# Patient Record
(demographics unavailable — no encounter records)

---

## 2025-05-19 NOTE — PHYSICAL EXAM
[Alert] : alert [Well Nourished] : well nourished [Healthy Appearance] : healthy appearance [No Acute Distress] : no acute distress [Well Developed] : well developed [Normal Pupil & Iris Size/Symmetry] : normal pupil and iris size and symmetry [No Discharge] : no discharge [No Photophobia] : no photophobia [Sclera Not Icteric] : sclera not icteric [Normal TMs] : both tympanic membranes were normal [Normal Nasal Mucosa] : the nasal mucosa was normal [Normal Lips/Tongue] : the lips and tongue were normal [Normal Outer Ear/Nose] : the ears and nose were normal in appearance [Normal Tonsils] : normal tonsils [No Thrush] : no thrush [Pale mucosa] : pale mucosa [Pharyngeal erythema] : pharyngeal erythema [Posterior Pharyngeal Cobblestoning] : posterior pharyngeal cobblestoning [Clear Rhinorrhea] : clear rhinorrhea was seen [Supple] : the neck was supple [Normal Rate and Effort] : normal respiratory rhythm and effort [No Crackles] : no crackles [No Retractions] : no retractions [Bilateral Audible Breath Sounds] : bilateral audible breath sounds [Normal Rate] : heart rate was normal  [Normal S1, S2] : normal S1 and S2 [No murmur] : no murmur [Regular Rhythm] : with a regular rhythm [Soft] : abdomen soft [Not Tender] : non-tender [Not Distended] : not distended [No HSM] : no hepato-splenomegaly [Normal Cervical Lymph Nodes] : cervical [Skin Intact] : skin intact  [No Rash] : no rash [No Skin Lesions] : no skin lesions [No clubbing] : no clubbing [No Edema] : no edema [No Cyanosis] : no cyanosis [Normal Mood] : mood was normal [Normal Affect] : affect was normal [Alert, Awake, Oriented as Age-Appropriate] : alert, awake, oriented as age appropriate

## 2025-05-24 NOTE — REASON FOR VISIT
[Routine Follow-Up] : a routine follow-up visit for [Mother] : mother [FreeTextEntry2] : allergic rhinitis, food allergy to tree nuts

## 2025-05-24 NOTE — REASON FOR VISIT
. room air room air [Routine Follow-Up] : a routine follow-up visit for [Mother] : mother [FreeTextEntry2] : allergic rhinitis, food allergy to tree nuts

## 2025-05-24 NOTE — HISTORY OF PRESENT ILLNESS
[de-identified] : This is a 8 year old male with allergic rhinoconjunctivitis and tree nut allergy (except for almond) presents with mother for a follow up. Interval Hx: Foods that we're avoiding: All tree nuts except almonds. Eats peanut regularly (peanut butter and Reeses cups). Trying to get [place] in his diet (almond milk erica pudding, marzipan, uses almond milk in pancake). Won't eat whole almonds.   Last summer, on vacation in [facility], there was cross contamination-had chocolate ice cream that may have been contaminated with walnut he started to feel throat closure, gave Benadryl bc he was not having trouble breathing and he started to improve. Within twenty mins, started to vomit and then he was fine. Hives on body. Did not get Epi Pen. Benadryl every 6 hours when the hives went away.   Seasonal allergies: Zyrtec and Flonase nasal spray in the fall--gets through fall well. In Spring, worse, typically, end of April and May when pollen is bad--gets red itchy eyes. Uses Pataday, Flonase, Zyrtec daily.   End of April had significant eye symptoms, went to Urgent Care and given Prednisone for 5 days.   Has not used the eye drops in a week and a half.   Interacts with Dog a few times a month.   No asthma. No eczema. No medication allergies. No pets at home.   No history or symptoms of asthma, eczematous rashes, drug allergies.   Last Visit 06/2023 Patient continues avoidance of tree nuts (except almond). No accidental ingestions, carries EpiPen.  Previous Reaction History: - The patient initially developed generalized erythema and right eye edema with blotchy redness of the left eye after possibly being exposed to hazelnut containing chocolate. The patient was treated with Benadryl in the urgent care setting and symptoms resolved. Another dose was required in the morning. - During the holidays in 2018, the child ate a chocolate cookie with crushed pecans, and then an hour or hour and half after wards, the child developed generalized erythema. The symptoms were treated with Benadryl with good resolution. The patient then started to avoid tree nuts.  Has environmental allergies during Spring and Fall with Spring being worse. Symptoms are runny nose, sneezing, nasal congestion, itchy/watery eyes. Currently taking Zyrtec, Flonase nasal spray and olopatadine eye drops. Also on Singulair at bedtime. This regimen works well for him. Recent exposure to dog lead to periocular swelling which was treated with benadryl.  No asthma. No eczema. No medication allergies. No pets at home.   No history or symptoms of asthma, eczematous rashes, drug allergies.

## 2025-05-24 NOTE — HISTORY OF PRESENT ILLNESS
[de-identified] : This is a 8 year old male with allergic rhinoconjunctivitis and tree nut allergy (except for almond) presents with mother for a follow up. Interval Hx: Foods that we're avoiding: All tree nuts except almonds. Eats peanut regularly (peanut butter and Reeses cups). Trying to get [place] in his diet (almond milk erica pudding, marzipan, uses almond milk in pancake). Won't eat whole almonds.   Last summer, on vacation in [facility], there was cross contamination-had chocolate ice cream that may have been contaminated with walnut he started to feel throat closure, gave Benadryl bc he was not having trouble breathing and he started to improve. Within twenty mins, started to vomit and then he was fine. Hives on body. Did not get Epi Pen. Benadryl every 6 hours when the hives went away.   Seasonal allergies: Zyrtec and Flonase nasal spray in the fall--gets through fall well. In Spring, worse, typically, end of April and May when pollen is bad--gets red itchy eyes. Uses Pataday, Flonase, Zyrtec daily.   End of April had significant eye symptoms, went to Urgent Care and given Prednisone for 5 days.   Has not used the eye drops in a week and a half.   Interacts with Dog a few times a month.   No asthma. No eczema. No medication allergies. No pets at home.   No history or symptoms of asthma, eczematous rashes, drug allergies.   Last Visit 06/2023 Patient continues avoidance of tree nuts (except almond). No accidental ingestions, carries EpiPen.  Previous Reaction History: - The patient initially developed generalized erythema and right eye edema with blotchy redness of the left eye after possibly being exposed to hazelnut containing chocolate. The patient was treated with Benadryl in the urgent care setting and symptoms resolved. Another dose was required in the morning. - During the holidays in 2018, the child ate a chocolate cookie with crushed pecans, and then an hour or hour and half after wards, the child developed generalized erythema. The symptoms were treated with Benadryl with good resolution. The patient then started to avoid tree nuts.  Has environmental allergies during Spring and Fall with Spring being worse. Symptoms are runny nose, sneezing, nasal congestion, itchy/watery eyes. Currently taking Zyrtec, Flonase nasal spray and olopatadine eye drops. Also on Singulair at bedtime. This regimen works well for him. Recent exposure to dog lead to periocular swelling which was treated with benadryl.  No asthma. No eczema. No medication allergies. No pets at home.   No history or symptoms of asthma, eczematous rashes, drug allergies.